# Patient Record
Sex: FEMALE | Race: WHITE | Employment: UNEMPLOYED | URBAN - METROPOLITAN AREA
[De-identification: names, ages, dates, MRNs, and addresses within clinical notes are randomized per-mention and may not be internally consistent; named-entity substitution may affect disease eponyms.]

---

## 2022-07-20 ENCOUNTER — OFFICE VISIT (OUTPATIENT)
Dept: FAMILY MEDICINE CLINIC | Age: 22
End: 2022-07-20
Payer: MEDICAID

## 2022-07-20 VITALS
HEIGHT: 67 IN | OXYGEN SATURATION: 96 % | RESPIRATION RATE: 16 BRPM | SYSTOLIC BLOOD PRESSURE: 115 MMHG | DIASTOLIC BLOOD PRESSURE: 80 MMHG | BODY MASS INDEX: 43.29 KG/M2 | HEART RATE: 90 BPM | TEMPERATURE: 98 F | WEIGHT: 275.8 LBS

## 2022-07-20 DIAGNOSIS — E03.9 HYPOTHYROIDISM, UNSPECIFIED TYPE: ICD-10-CM

## 2022-07-20 DIAGNOSIS — Z00.00 ROUTINE HISTORY AND PHYSICAL EXAMINATION OF ADULT: ICD-10-CM

## 2022-07-20 DIAGNOSIS — N94.6 DYSMENORRHEA: ICD-10-CM

## 2022-07-20 DIAGNOSIS — Z00.00 ENCOUNTER FOR MEDICAL EXAMINATION TO ESTABLISH CARE: Primary | ICD-10-CM

## 2022-07-20 DIAGNOSIS — L30.9 ECZEMA, UNSPECIFIED TYPE: ICD-10-CM

## 2022-07-20 PROBLEM — R51.9 HEADACHE: Status: ACTIVE | Noted: 2018-02-08

## 2022-07-20 PROBLEM — D50.0 IRON DEFICIENCY ANEMIA DUE TO CHRONIC BLOOD LOSS: Status: ACTIVE | Noted: 2018-02-09

## 2022-07-20 LAB
ALBUMIN SERPL-MCNC: 4.5 G/DL (ref 3.5–5.2)
ALP BLD-CCNC: 85 U/L (ref 35–104)
ALT SERPL-CCNC: 135 U/L (ref 0–32)
ANION GAP SERPL CALCULATED.3IONS-SCNC: 16 MMOL/L (ref 7–16)
AST SERPL-CCNC: 93 U/L (ref 0–31)
BASOPHILS ABSOLUTE: 0.05 E9/L (ref 0–0.2)
BASOPHILS RELATIVE PERCENT: 0.4 % (ref 0–2)
BILIRUB SERPL-MCNC: <0.2 MG/DL (ref 0–1.2)
BUN BLDV-MCNC: 10 MG/DL (ref 6–20)
CALCIUM SERPL-MCNC: 9.8 MG/DL (ref 8.6–10.2)
CHLORIDE BLD-SCNC: 102 MMOL/L (ref 98–107)
CO2: 21 MMOL/L (ref 22–29)
CREAT SERPL-MCNC: 0.7 MG/DL (ref 0.5–1)
EOSINOPHILS ABSOLUTE: 0.2 E9/L (ref 0.05–0.5)
EOSINOPHILS RELATIVE PERCENT: 1.8 % (ref 0–6)
GFR AFRICAN AMERICAN: >60
GFR NON-AFRICAN AMERICAN: >60 ML/MIN/1.73
GLUCOSE BLD-MCNC: 78 MG/DL (ref 74–99)
HCT VFR BLD CALC: 42.6 % (ref 34–48)
HEMOGLOBIN: 13.3 G/DL (ref 11.5–15.5)
IMMATURE GRANULOCYTES #: 0.04 E9/L
IMMATURE GRANULOCYTES %: 0.4 % (ref 0–5)
LYMPHOCYTES ABSOLUTE: 2.48 E9/L (ref 1.5–4)
LYMPHOCYTES RELATIVE PERCENT: 21.8 % (ref 20–42)
MCH RBC QN AUTO: 28.2 PG (ref 26–35)
MCHC RBC AUTO-ENTMCNC: 31.2 % (ref 32–34.5)
MCV RBC AUTO: 90.3 FL (ref 80–99.9)
MONOCYTES ABSOLUTE: 1.08 E9/L (ref 0.1–0.95)
MONOCYTES RELATIVE PERCENT: 9.5 % (ref 2–12)
NEUTROPHILS ABSOLUTE: 7.53 E9/L (ref 1.8–7.3)
NEUTROPHILS RELATIVE PERCENT: 66.1 % (ref 43–80)
PDW BLD-RTO: 13.7 FL (ref 11.5–15)
PLATELET # BLD: 458 E9/L (ref 130–450)
PMV BLD AUTO: 9.7 FL (ref 7–12)
POTASSIUM SERPL-SCNC: 5.3 MMOL/L (ref 3.5–5)
RBC # BLD: 4.72 E12/L (ref 3.5–5.5)
SODIUM BLD-SCNC: 139 MMOL/L (ref 132–146)
T4 FREE: 1.63 NG/DL (ref 0.93–1.7)
TOTAL PROTEIN: 8.5 G/DL (ref 6.4–8.3)
TSH SERPL DL<=0.05 MIU/L-ACNC: 2.73 UIU/ML (ref 0.27–4.2)
WBC # BLD: 11.4 E9/L (ref 4.5–11.5)

## 2022-07-20 PROCEDURE — 99385 PREV VISIT NEW AGE 18-39: CPT | Performed by: FAMILY MEDICINE

## 2022-07-20 RX ORDER — NORGESTIMATE AND ETHINYL ESTRADIOL 0.25-0.035
1 KIT ORAL DAILY
Qty: 1 PACKET | Refills: 11 | Status: SHIPPED | OUTPATIENT
Start: 2022-07-20

## 2022-07-20 RX ORDER — BETAMETHASONE DIPROPIONATE 0.05 %
OINTMENT (GRAM) TOPICAL 2 TIMES DAILY
Qty: 1 EACH | Refills: 2 | Status: SHIPPED | OUTPATIENT
Start: 2022-07-20

## 2022-07-20 RX ORDER — LEVOTHYROXINE SODIUM 0.05 MG/1
50 TABLET ORAL DAILY
COMMUNITY
End: 2022-11-01 | Stop reason: SDUPTHER

## 2022-07-20 RX ORDER — CLOBETASOL PROPIONATE 0.5 MG/G
OINTMENT TOPICAL 2 TIMES DAILY
COMMUNITY
End: 2022-07-20 | Stop reason: SDUPTHER

## 2022-07-20 RX ORDER — NORGESTIMATE AND ETHINYL ESTRADIOL 0.25-0.035
1 KIT ORAL DAILY
COMMUNITY
End: 2022-07-20 | Stop reason: SDUPTHER

## 2022-07-20 RX ORDER — BETAMETHASONE DIPROPIONATE 0.05 %
OINTMENT (GRAM) TOPICAL 2 TIMES DAILY
COMMUNITY
End: 2022-07-20 | Stop reason: SDUPTHER

## 2022-07-20 RX ORDER — CLOBETASOL PROPIONATE 0.5 MG/G
OINTMENT TOPICAL 2 TIMES DAILY
Qty: 1 EACH | Refills: 2 | Status: SHIPPED
Start: 2022-07-20 | End: 2022-10-28 | Stop reason: ALTCHOICE

## 2022-07-20 SDOH — ECONOMIC STABILITY: FOOD INSECURITY: WITHIN THE PAST 12 MONTHS, YOU WORRIED THAT YOUR FOOD WOULD RUN OUT BEFORE YOU GOT MONEY TO BUY MORE.: NEVER TRUE

## 2022-07-20 SDOH — ECONOMIC STABILITY: FOOD INSECURITY: WITHIN THE PAST 12 MONTHS, THE FOOD YOU BOUGHT JUST DIDN'T LAST AND YOU DIDN'T HAVE MONEY TO GET MORE.: NEVER TRUE

## 2022-07-20 ASSESSMENT — PATIENT HEALTH QUESTIONNAIRE - PHQ9
SUM OF ALL RESPONSES TO PHQ9 QUESTIONS 1 & 2: 0
SUM OF ALL RESPONSES TO PHQ QUESTIONS 1-9: 0
SUM OF ALL RESPONSES TO PHQ QUESTIONS 1-9: 0
1. LITTLE INTEREST OR PLEASURE IN DOING THINGS: 0
SUM OF ALL RESPONSES TO PHQ QUESTIONS 1-9: 0
2. FEELING DOWN, DEPRESSED OR HOPELESS: 0
SUM OF ALL RESPONSES TO PHQ QUESTIONS 1-9: 0

## 2022-07-20 ASSESSMENT — SOCIAL DETERMINANTS OF HEALTH (SDOH): HOW HARD IS IT FOR YOU TO PAY FOR THE VERY BASICS LIKE FOOD, HOUSING, MEDICAL CARE, AND HEATING?: NOT HARD AT ALL

## 2022-07-20 NOTE — PROGRESS NOTES
HPI:  The patient is a 25 y.o. female who presents today to establish care. Previous PCP was Dr. Malathi Garcia    The patient is presenting for a routine physical.    Hypothyroidism:  Patient is here today to follow up chronic hypothyroidism. This problem is longstanding. This is   generally controlled on current medication regimen, Levothyroxine 50 mcg. Takes medication as directed and tolerates well. Symptoms from thyroid standpoint include none. Denies fatigue, changes in skin, hair or nails, unintentional weight loss or gain.  Additional testing, including thyroid ultrasound and thyroid antibodies has not been done in the past. Patient does not have exposure to radiation and/or iodine in the past.      Past Medical History:   Diagnosis Date    Eczema     Hypothyroidism       Past Surgical History:   Procedure Laterality Date    APPENDECTOMY        Family History   Problem Relation Age of Onset    Diabetes Mother     High Blood Pressure Mother     High Cholesterol Mother     Rheum Arthritis Mother     Osteoarthritis Mother     No Known Problems Father     Hypothyroidism Sister     No Known Problems Sister     No Known Problems Sister     Rheum Arthritis Brother     Hypothyroidism Brother      Social History     Socioeconomic History    Marital status: Single     Spouse name: Not on file    Number of children: Not on file    Years of education: Not on file    Highest education level: Not on file   Occupational History    Not on file   Tobacco Use    Smoking status: Never    Smokeless tobacco: Never   Vaping Use    Vaping Use: Never used   Substance and Sexual Activity    Alcohol use: Never    Drug use: Never    Sexual activity: Never   Other Topics Concern    Not on file   Social History Narrative    Not on file     Social Determinants of Health     Financial Resource Strain: Low Risk     Difficulty of Paying Living Expenses: Not hard at all   Food Insecurity: No Food Insecurity    Worried About Running Out of Food in the Last Year: Never true    Ran Out of Food in the Last Year: Never true   Transportation Needs: Not on file   Physical Activity: Not on file   Stress: Not on file   Social Connections: Not on file   Intimate Partner Violence: Not on file   Housing Stability: Not on file      No Known Allergies     Review of Systems:  Constitutional:  No fever, no fatigue, no chills, no headaches, no weight change  Dermatology:  No rash, no mole, no dry or sensitive skin  ENT:  No cough, no sore throat, no sinus pain, no runny nose, no ear pain  Cardiology:  No chest pain, no palpitations, no leg edema, no shortness of breath, no PND  Endocrinology:  No polydipsia, no polyuria, no cold intolerance, no heat intolerance, no polyphagia, no hair changes  Gastroenterology:  No dysphagia, no abdominal pain, no nausea, no vomiting, no constipation, no diarrhea, no heartburn  Female Reproductive:  No hot flashes, no abnormal vaginal discharge, no pain with menstruation, no pelvic pain  Musculoskeletal:  No joint pain, no leg cramps, no back pain, no muscle aches  Respiratory:  No shortness of breath, no orthopnea, no wheezing, no WOODS, no hemoptysis  Urology:  No blood in the urine, no urinary frequency, no urinary incontinence, no urinary urgency, no nocturia, no dysuria  Neurology:  No numbness/tingling, no dizziness, no weakness  Psychology:  No depression, no sleep disturbances, no suicidal ideation, no anxiety      Vitals:    07/20/22 1501   BP: 115/80   Pulse: 90   Resp: 16   Temp: 98 °F (36.7 °C)   TempSrc: Temporal   SpO2: 96%   Weight: 275 lb 12.8 oz (125.1 kg)   Height: 5' 7\" (1.702 m)       Physical Exam  Vitals and nursing note reviewed. Constitutional:       Appearance: She is well-developed. HENT:      Head: Normocephalic and atraumatic.       Right Ear: External ear normal.      Left Ear: External ear normal.      Nose: Nose normal.   Eyes:      Conjunctiva/sclera: Conjunctivae normal.      Pupils: Pupils are equal, round, and reactive to light. Neck:      Thyroid: No thyromegaly. Cardiovascular:      Rate and Rhythm: Normal rate and regular rhythm. Heart sounds: Normal heart sounds. Pulmonary:      Effort: Pulmonary effort is normal.      Breath sounds: Normal breath sounds. No wheezing. Abdominal:      General: Bowel sounds are normal.      Palpations: Abdomen is soft. Tenderness: There is no abdominal tenderness. Musculoskeletal:         General: Normal range of motion. Cervical back: Normal range of motion and neck supple. Skin:     General: Skin is warm and dry. Findings: No rash. Neurological:      Mental Status: She is alert and oriented to person, place, and time. Deep Tendon Reflexes: Reflexes are normal and symmetric. Psychiatric:         Behavior: Behavior normal.       Assessment/Plan:      Jess Quiñonez was seen today for establish care and medication refill. Diagnoses and all orders for this visit:    Encounter for medical examination to establish care    Routine history and physical examination of adult    Dysmenorrhea  -     CBC with Auto Differential; Future  -     TSH; Future  -     norgestimate-ethinyl estradiol (JESUS MANUEL) 0.25-35 MG-MCG per tablet; Take 1 tablet by mouth in the morning. Labs ordered  Will continue birth control as it is controlling symptoms and patient has a history of need blood transfusion due to bad periods. Hypothyroidism, unspecified type  -     Comprehensive Metabolic Panel; Future  -     CBC with Auto Differential; Future  -     TSH; Future  -     T4, Free; Future  Labs ordered. Eczema, unspecified type  -     betamethasone dipropionate (DIPROLENE) 0.05 % ointment; Apply topically 2 times daily Apply topically 2 times daily. -     clobetasol (TEMOVATE) 0.05 % ointment; Apply topically 2 times daily Apply topically 2 times daily. As above. Call or go to ED immediately if symptoms worsen or persist.  No follow-ups on file. , or sooner if necessary. Educational materials and/or home exercises printed for patient's review and were included in patient instructions on his/her After Visit Summary and given to patient at the end of visit. Counseled regarding above diagnosis, including possible risks and complications,  especially if left uncontrolled. Counseled regarding the possible side effects, risks, benefits and alternatives to treatment; patient and/or guardian verbalizes understanding, agrees, feels comfortable with and wishes to proceed with above treatment plan. Advised patient to call with any new medication issues, and read all Rx info from pharmacy to assure aware of all possible risks and side effects of medication before taking. Reviewed age and gender appropriate health screening exams and vaccinations. Advised patient regarding importance of keeping up with recommended health maintenance and to schedule as soon as possible if overdue, as this is important in assessing for undiagnosed pathology, especially cancer, as well as protecting against potentially harmful/life threatening disease. Patient and/or guardian verbalizes understanding and agrees with above counseling, assessment and plan. All questions answered. Chelsea Robbins DO  7/20/2022    I have personally reviewed and updated the chief complaint, HPI, Past Medical, Family and Social History, as well as the above Review of Systems.

## 2022-08-01 ENCOUNTER — TELEPHONE (OUTPATIENT)
Dept: FAMILY MEDICINE CLINIC | Age: 22
End: 2022-08-01

## 2022-08-01 DIAGNOSIS — L30.9 ECZEMA, UNSPECIFIED TYPE: Primary | ICD-10-CM

## 2022-08-01 NOTE — TELEPHONE ENCOUNTER
According to insurance she must try and fail two of the preferred medications which are betamethasone dipropionate-calcipotriene, Betamethasone Valerate cream, lotion, Fluticasone Propionate, mometasone Furoate, triamcinolone acetonide, prednicarbate cream

## 2022-08-02 NOTE — TELEPHONE ENCOUNTER
Attempted to reach patient's mother [facility] and [facility] stating to return call to office

## 2022-08-03 NOTE — TELEPHONE ENCOUNTER
Betamethasone sent to pharmacy. Recommend only using for 1 week if using on face before taking a break for 2 weeks.

## 2022-10-28 ENCOUNTER — OFFICE VISIT (OUTPATIENT)
Dept: FAMILY MEDICINE CLINIC | Age: 22
End: 2022-10-28
Payer: MEDICAID

## 2022-10-28 VITALS
WEIGHT: 280.56 LBS | TEMPERATURE: 99 F | OXYGEN SATURATION: 95 % | BODY MASS INDEX: 44.03 KG/M2 | SYSTOLIC BLOOD PRESSURE: 127 MMHG | DIASTOLIC BLOOD PRESSURE: 70 MMHG | HEIGHT: 67 IN

## 2022-10-28 DIAGNOSIS — N92.6 IRREGULAR MENSES: ICD-10-CM

## 2022-10-28 DIAGNOSIS — K76.0 FATTY LIVER: ICD-10-CM

## 2022-10-28 DIAGNOSIS — N94.6 DYSMENORRHEA: ICD-10-CM

## 2022-10-28 DIAGNOSIS — E03.9 HYPOTHYROIDISM, UNSPECIFIED TYPE: Primary | ICD-10-CM

## 2022-10-28 DIAGNOSIS — F41.9 ANXIETY: ICD-10-CM

## 2022-10-28 DIAGNOSIS — Z83.3 FAMILY HISTORY OF DIABETES MELLITUS: ICD-10-CM

## 2022-10-28 DIAGNOSIS — Z11.4 SCREENING FOR HIV (HUMAN IMMUNODEFICIENCY VIRUS): ICD-10-CM

## 2022-10-28 DIAGNOSIS — Z20.2 POSSIBLE EXPOSURE TO STD: ICD-10-CM

## 2022-10-28 DIAGNOSIS — F84.0 AUTISM: ICD-10-CM

## 2022-10-28 PROCEDURE — 99203 OFFICE O/P NEW LOW 30 MIN: CPT | Performed by: FAMILY MEDICINE

## 2022-10-28 PROCEDURE — 99214 OFFICE O/P EST MOD 30 MIN: CPT | Performed by: FAMILY MEDICINE

## 2022-10-28 RX ORDER — OMEGA-3/DHA/EPA/FISH OIL 60 MG-90MG
CAPSULE ORAL
COMMUNITY

## 2022-10-28 ASSESSMENT — ENCOUNTER SYMPTOMS
VOMITING: 0
SHORTNESS OF BREATH: 0
NAUSEA: 0
COUGH: 0
ABDOMINAL PAIN: 0

## 2022-10-28 ASSESSMENT — PATIENT HEALTH QUESTIONNAIRE - PHQ9
SUM OF ALL RESPONSES TO PHQ QUESTIONS 1-9: 1
SUM OF ALL RESPONSES TO PHQ9 QUESTIONS 1 & 2: 1
1. LITTLE INTEREST OR PLEASURE IN DOING THINGS: 0
SUM OF ALL RESPONSES TO PHQ QUESTIONS 1-9: 1
2. FEELING DOWN, DEPRESSED OR HOPELESS: 1
SUM OF ALL RESPONSES TO PHQ QUESTIONS 1-9: 1
SUM OF ALL RESPONSES TO PHQ QUESTIONS 1-9: 1

## 2022-10-28 NOTE — PROGRESS NOTES
[address] [place] [address]  FAMILY MEDICINE RESIDENCY PROGRAM   OFFICE PROGRESS NOTE  DATE OF VISIT : 10/28/22    Patient : Char Agosto    : female   Age : 25 y.o.  : 2000           Chief Complaint :   Chief Complaint   Patient presents with    New Patient       HPI:   25 y.o. female comes in today for here as a new patient. Moved to [place] from [place] in March with family. She is currently not working. Hypothyroidism taking levothyroxine. Fatty liver taking fish oil. Heavy and irregular menses requiring IV blood transfusion in 2019 in [place], [place]  and taking ocps now since 2019 when she was hospitalized for bleeding. Reports that she had a pelvic u/s in the past which showed an ovarian cyst. Menarche ate 13 and menses have always been irregular. Would like to have treatment of anxiety. Trouble focusing. Body ache, nausea, palpitations and chest tightness. Stressful being around people, has anxiety attacks 2 x per day. She had seen a counselor in the past.  No hx of mdd. No SI/HI. PHQ-2 is 1. Autism diagnosed at age 12. The patient would like resources to help with symptoms as she finds it hard to work with the autism. History of speech therapy in school for lisp and special classes for math help. Uses diprolene on thighs for eczema. Patient Active Problem List   Diagnosis    Headache    Hypothyroid    Iron deficiency anemia due to chronic blood loss    Myopia    Autism    Fatty liver    Irregular menses       Past Medical History:   Diagnosis Date    Eczema     Hypothyroidism        Current Outpatient Medications on File Prior to Visit   Medication Sig Dispense Refill    levothyroxine (SYNTHROID) 50 MCG tablet Take 50 mcg by mouth in the morning. norgestimate-ethinyl estradiol (JESUS MANUEL) 0.25-35 MG-MCG per tablet Take 1 tablet by mouth in the morning.  1 packet 11    betamethasone dipropionate (DIPROLENE) 0.05 % ointment Apply topically 2 times daily Apply topically 2 times daily. 1 each 2    Omega-3 Fatty Acids (FISH OIL) 500 MG CAPS        No current facility-administered medications on file prior to visit. No Known Allergies    Family History   Problem Relation Age of Onset    Diabetes Mother     High Blood Pressure Mother     High Cholesterol Mother     Rheum Arthritis Mother     Osteoarthritis Mother     Other Father         webbed feet    Hypothyroidism Sister     Hypothyroidism Sister     No Known Problems Sister     Rheum Arthritis Brother     Hypothyroidism Brother        Past Surgical History:   Procedure Laterality Date    APPENDECTOMY  2019       Social History     Tobacco Use    Smoking status: Never    Smokeless tobacco: Never   Vaping Use    Vaping Use: Never used   Substance Use Topics    Alcohol use: Yes     Comment: rare, 1 drink    Drug use: Not Currently     Types: Marijuana Nan Jhoan)     PHQ-9 Total Score: 1 (10/28/2022  9:58 AM)   Review of Systems  Review of Systems   Constitutional:  Negative for chills and fever. HENT:  Negative for congestion. Respiratory:  Negative for cough and shortness of breath. Cardiovascular:  Negative for chest pain, palpitations and leg swelling. Gastrointestinal:  Negative for abdominal pain, nausea and vomiting. Psychiatric/Behavioral:  The patient is nervous/anxious. Insomnia    Physical Exam:  VS:  Blood pressure 127/70, temperature 99 °F (37.2 °C), temperature source Temporal, height 5' 7\" (1.702 m), weight 280 lb 9 oz (127.3 kg), SpO2 95 %. LMP 10/21/22  Physical Exam  Constitutional:       Appearance: She is well-developed. HENT:      Head: Normocephalic and atraumatic. Cardiovascular:      Rate and Rhythm: Normal rate and regular rhythm. Heart sounds: No murmur heard. No friction rub. No gallop. Pulmonary:      Breath sounds: Normal breath sounds. No wheezing, rhonchi or rales. Abdominal:      General: Bowel sounds are normal.      Palpations: Abdomen is soft. There is no mass. Tenderness: There is no abdominal tenderness. Skin:     Nails: There is no clubbing.   hirsutism    Assessment/Plan:  1. Hypothyroidism, unspecified type  Labs in 7/22 were normal on current medication dose    2. Dysmenorrhea  Continue OCPs at this time    3. Irregular menses  R/o PCOS. Obtain labs. - TESTOSTERONE; Future  - Prolactin; Future  - Testosterone, free, total; Future  - Follicle Stimulating Hormone; Future  - Luteinizing Hormone; Future  - DHEA-Sulfate; Future    4. Anxiety  Suspect social anxiety disorder. Discussed methods of controlled breathing for anxiety. Pt scored low on PHQ-9 but would benefit from counseling.   - 2 [facility] (Louie MCFARLAND)  - CBC with Auto Differential; Future    5. Autism  Given referral information for the [facility] at [facility]. Fatty liver  - Lipid Panel; Future  - Comprehensive Metabolic Panel; Future    7. Family history of diabetes mellitus  - Hemoglobin A1C; Future    8. Possible exposure to STD  - Sonja Matte; Future  - HIV Screen; Future  - RPR; Future  - Hepatitis Panel, Acute; Future    9. Screening for HIV (human immunodeficiency virus)  Screening for hiv    Additional plan and future considerations:   will need a pap in the future; ROR from last pcp in [place] Dr. Negrito Arias    Return for 2-3 months for anxiety .     Signed by : Ankit Valdes MD

## 2022-11-01 RX ORDER — LEVOTHYROXINE SODIUM 0.05 MG/1
50 TABLET ORAL DAILY
Qty: 90 TABLET | Refills: 1 | Status: SHIPPED | OUTPATIENT
Start: 2022-11-01

## 2022-11-01 NOTE — TELEPHONE ENCOUNTER
Last Appointment:  10/28/2022  Future Appointments   Date Time Provider Radha Angel   2/8/2023  2:00 PM Sander Varela MD [facility]

## 2023-04-24 RX ORDER — LEVOTHYROXINE SODIUM 0.05 MG/1
TABLET ORAL
Qty: 90 TABLET | Refills: 1 | Status: SHIPPED | OUTPATIENT
Start: 2023-04-24

## 2023-05-05 ENCOUNTER — TELEPHONE (OUTPATIENT)
Dept: FAMILY MEDICINE CLINIC | Age: 23
End: 2023-05-05

## 2023-05-05 DIAGNOSIS — K76.89 HEPATIC CYST: ICD-10-CM

## 2023-05-05 DIAGNOSIS — G93.5 CHIARI MALFORMATION TYPE I (HCC): ICD-10-CM

## 2023-05-05 DIAGNOSIS — K76.0 FATTY LIVER: Primary | ICD-10-CM

## 2023-05-05 NOTE — TELEPHONE ENCOUNTER
Old records received with hepatic steatosis with complicated cyst on previous 1/2020 imaging and 1 year follow up advised. Pt missed follow up appointment. Called patient and voicemail is full. Mailed letter with blood work orders and notification that she needs a liver ultrasound, which has been ordered.

## 2023-06-22 DIAGNOSIS — N94.6 DYSMENORRHEA: ICD-10-CM

## 2023-06-22 RX ORDER — NORGESTIMATE AND ETHINYL ESTRADIOL 0.25-0.035
KIT ORAL
Qty: 84 TABLET | OUTPATIENT
Start: 2023-06-22

## 2023-12-14 RX ORDER — CLOBETASOL PROPIONATE 0.5 MG/G
OINTMENT TOPICAL 2 TIMES DAILY
Qty: 15 G | OUTPATIENT
Start: 2023-12-14

## 2023-12-18 RX ORDER — CLOBETASOL PROPIONATE 0.5 MG/G
OINTMENT TOPICAL 2 TIMES DAILY
Qty: 15 G | OUTPATIENT
Start: 2023-12-18